# Patient Record
Sex: MALE | Race: WHITE
[De-identification: names, ages, dates, MRNs, and addresses within clinical notes are randomized per-mention and may not be internally consistent; named-entity substitution may affect disease eponyms.]

---

## 2019-04-26 ENCOUNTER — HOSPITAL ENCOUNTER (OUTPATIENT)
Dept: HOSPITAL 53 - M ED | Age: 59
LOS: 2 days | Discharge: HOME | End: 2019-04-28
Attending: SURGERY
Payer: COMMERCIAL

## 2019-04-26 VITALS — SYSTOLIC BLOOD PRESSURE: 111 MMHG | DIASTOLIC BLOOD PRESSURE: 57 MMHG

## 2019-04-26 VITALS — SYSTOLIC BLOOD PRESSURE: 134 MMHG | DIASTOLIC BLOOD PRESSURE: 62 MMHG

## 2019-04-26 VITALS — BODY MASS INDEX: 29.18 KG/M2 | WEIGHT: 215.46 LBS | HEIGHT: 72 IN

## 2019-04-26 VITALS — DIASTOLIC BLOOD PRESSURE: 54 MMHG | SYSTOLIC BLOOD PRESSURE: 94 MMHG

## 2019-04-26 DIAGNOSIS — Z79.899: ICD-10-CM

## 2019-04-26 DIAGNOSIS — E78.49: ICD-10-CM

## 2019-04-26 DIAGNOSIS — K35.32: Primary | ICD-10-CM

## 2019-04-26 DIAGNOSIS — I10: ICD-10-CM

## 2019-04-26 PROCEDURE — 88302 TISSUE EXAM BY PATHOLOGIST: CPT

## 2019-04-26 PROCEDURE — 44970 LAPAROSCOPY APPENDECTOMY: CPT

## 2019-04-26 PROCEDURE — 99284 EMERGENCY DEPT VISIT MOD MDM: CPT

## 2019-04-26 PROCEDURE — 85027 COMPLETE CBC AUTOMATED: CPT

## 2019-04-26 PROCEDURE — 96374 THER/PROPH/DIAG INJ IV PUSH: CPT

## 2019-04-26 PROCEDURE — 96361 HYDRATE IV INFUSION ADD-ON: CPT

## 2019-04-26 PROCEDURE — 96376 TX/PRO/DX INJ SAME DRUG ADON: CPT

## 2019-04-26 PROCEDURE — 36415 COLL VENOUS BLD VENIPUNCTURE: CPT

## 2019-04-26 PROCEDURE — 96372 THER/PROPH/DIAG INJ SC/IM: CPT

## 2019-04-26 PROCEDURE — 80048 BASIC METABOLIC PNL TOTAL CA: CPT

## 2019-04-26 PROCEDURE — 74019 RADEX ABDOMEN 2 VIEWS: CPT

## 2019-04-26 PROCEDURE — 96375 TX/PRO/DX INJ NEW DRUG ADDON: CPT

## 2019-04-26 RX ADMIN — POTASSIUM CHLORIDE, DEXTROSE MONOHYDRATE AND SODIUM CHLORIDE SCH MLS/HR: 150; 5; 450 INJECTION, SOLUTION INTRAVENOUS at 21:45

## 2019-04-26 RX ADMIN — DOCUSATE SODIUM,SENNOSIDES SCH TAB: 50; 8.6 TABLET, FILM COATED ORAL at 21:44

## 2019-04-26 NOTE — HPE
DATE OF ADMISSION:   04/26/2019

 

CHIEF COMPLAINT:  Abdominal pain, acute appendicitis.

 

HISTORY OF PRESENT ILLNESS:  This is a 59-year-old male who was transferred from

[facility] for acute appendicitis.

 

He states that he has been having nausea for about a month with dry heaves but no

emesis.  He started feeling sick and dizzy yesterday.  This morning around 3:30

a.m. he started having right lower quadrant abdominal pain, which has only gotten

worse.  He describes it as sharp and stabbing in nature.  It does not radiate

anywhere.  However, his abdomen is diffusely tender as well.  He denies any

diarrhea, fevers, chills, shortness of breath or palpitations.  He states his

only abdominal surgery was a left hernia repair in [place] over 5 years ago.  He

has not had any bright red blood per rectum, melena, or hematemesis.

 

REVIEW OF SYSTEMS:

Constitutional:  Denies any weight changes, fevers or chills.

HEENT:  Denies any headaches, visual changes, changes to hearing, sore throat,

runny nose or odynophagia.

Cardiovascular:  Denies any palpitations, chest pain, orthopnea, edema or

paroxysmal nocturnal dyspnea.

Respiratory:  Denies any shortness of breath, cough, sputum production, wheezes

or hemoptysis.

Gastrointestinal:  Positive for abdominal pain as described above as well as

nausea.  Denies emesis, hematemesis, hematochezia, melena, tenesmus or

obstipation.

Genitourinary:  Positive for difficulty urinating and a history of urethral

stricture.  Denies any polyuria, hematuria or incontinence.

Musculoskeletal:  Denies any joint swelling, muscle aches or pains.

Hematologic:  Denies any easy bruising or bleeding, petechiae, purpura.

Lymphatic:  Denies any new lumps or bumps anywhere.

Neurologic:  Denies any numbness or tingling, changes to smell, vision, taste or

hearing, history of seizures or motor deficits.

Psych:  Denies any anxiety, depression, anhedonia, paranoia or episodes of

kim.

 

PAST MEDICAL HISTORY:

Hypertension.

Hyperlipidemia.

History of urethral stricture.

Herpes simplex virus (HSV).

 

HOME MEDICATIONS:

- amlodipine 5 mg by mouth daily

- atorvastatin 10 mg by mouth daily

- lisinopril 40 mg by mouth daily

- Lysine 500 mg by mouth daily

- Saw Palmetto 160 mg by mouth daily

- spirolactone 100 mg by mouth daily

- Flomax 0.4 mg by mouth daily

- valacyclovir 500 mg by mouth daily

 

PAST SURGICAL HISTORY:

Left hernia repair in [place] over 5 years ago.

Surgery for urethral stricture.

Right knee surgery.

Parathyroidectomy.

Skin graft.

 

ALLERGIES:  None.

 

FAMILY HISTORY:  Noncontributory.

 

SOCIAL HISTORY:  The patient denies smoking cigarettes, tobacco use, alcohol, or

drug use.

 

PHYSICAL EXAMINATION:

Temperature 98.3 degrees, pulse is 70 and regular, respiratory rate 16, blood

pressure 160/60, pulse oximetry is 97% on room air.

General:  The patient is visibly uncomfortable lying on the stretcher.  His

position of comfort is with his hips flexed on his right side.

HEENT:  Mucous membranes are moist.  Head is atraumatic, normocephalic.

Extraocular eye movements are intact.

Neck:  Supple, no masses.

Respiratory:  Clear to auscultation bilaterally.  No wheezes, rhonchi or rales.

Heart:  Regular rate and rhythm.  No murmurs, gallops or rubs.

Abdomen:  Positive bowel sounds.  Belly is soft, and there is diffuse tenderness

to palpation.  He does have some pain upon flexion of the hips with guarding and

rebound tenderness.

Extremities:  No lower extremity edema bilaterally.  The patient has good

pulses.

 

CT of the abdomen and pelvis from [facility] was reviewed and shows

inflammation of the appendix with periappendiceal stranding suggestive of

appendicitis.

 

IMPRESSION/PLAN:

This is a 59-year-old male with an acute appendicitis.

 

Plan is to go to the operating room (OR) for a laparoscopic appendectomy.  He

will be admitted under the service of Dr. Nikolas Claros DO.

 

Preoperative antibiotic will be Zosyn 3.375 mg IV.

## 2019-04-27 VITALS — DIASTOLIC BLOOD PRESSURE: 59 MMHG | SYSTOLIC BLOOD PRESSURE: 125 MMHG

## 2019-04-27 VITALS — SYSTOLIC BLOOD PRESSURE: 131 MMHG | DIASTOLIC BLOOD PRESSURE: 61 MMHG

## 2019-04-27 VITALS — SYSTOLIC BLOOD PRESSURE: 131 MMHG | DIASTOLIC BLOOD PRESSURE: 68 MMHG

## 2019-04-27 VITALS — DIASTOLIC BLOOD PRESSURE: 65 MMHG | SYSTOLIC BLOOD PRESSURE: 138 MMHG

## 2019-04-27 VITALS — SYSTOLIC BLOOD PRESSURE: 136 MMHG | DIASTOLIC BLOOD PRESSURE: 62 MMHG

## 2019-04-27 VITALS — SYSTOLIC BLOOD PRESSURE: 125 MMHG | DIASTOLIC BLOOD PRESSURE: 58 MMHG

## 2019-04-27 VITALS — SYSTOLIC BLOOD PRESSURE: 132 MMHG | DIASTOLIC BLOOD PRESSURE: 61 MMHG

## 2019-04-27 LAB
BUN SERPL-MCNC: 23 MG/DL (ref 7–18)
CALCIUM SERPL-MCNC: 10.8 MG/DL (ref 8.5–10.1)
CHLORIDE SERPL-SCNC: 106 MEQ/L (ref 98–107)
CO2 SERPL-SCNC: 24 MEQ/L (ref 21–32)
CREAT SERPL-MCNC: 1.48 MG/DL (ref 0.7–1.3)
GFR SERPL CREATININE-BSD FRML MDRD: 51.8 ML/MIN/{1.73_M2} (ref 56–?)
GLUCOSE SERPL-MCNC: 154 MG/DL (ref 70–100)
HCT VFR BLD AUTO: 33.3 % (ref 42–52)
HGB BLD-MCNC: 11.4 G/DL (ref 13.5–17.5)
MCH RBC QN AUTO: 33.9 PG (ref 27–33)
MCHC RBC AUTO-ENTMCNC: 34.2 G/DL (ref 32–36.5)
MCV RBC AUTO: 99.1 FL (ref 80–96)
PLATELET # BLD AUTO: 209 10^3/UL (ref 150–450)
POTASSIUM SERPL-SCNC: 4.6 MEQ/L (ref 3.5–5.1)
RBC # BLD AUTO: 3.36 10^6/UL (ref 4.3–6.1)
SODIUM SERPL-SCNC: 134 MEQ/L (ref 136–145)
WBC # BLD AUTO: 18.8 10^3/UL (ref 4–10)

## 2019-04-27 RX ADMIN — DEXTROSE MONOHYDRATE SCH MLS/HR: 5 INJECTION INTRAVENOUS at 12:21

## 2019-04-27 RX ADMIN — SPIRONOLACTONE SCH MG: 50 TABLET ORAL at 08:23

## 2019-04-27 RX ADMIN — ACETAMINOPHEN PRN MG: 325 TABLET ORAL at 12:25

## 2019-04-27 RX ADMIN — DEXTROSE MONOHYDRATE SCH MLS/HR: 5 INJECTION INTRAVENOUS at 06:42

## 2019-04-27 RX ADMIN — PANTOPRAZOLE SODIUM SCH MG: 40 TABLET, DELAYED RELEASE ORAL at 08:24

## 2019-04-27 RX ADMIN — DOCUSATE SODIUM,SENNOSIDES SCH TAB: 50; 8.6 TABLET, FILM COATED ORAL at 20:35

## 2019-04-27 RX ADMIN — DEXTROSE MONOHYDRATE SCH MLS/HR: 5 INJECTION INTRAVENOUS at 23:50

## 2019-04-27 RX ADMIN — ACETAMINOPHEN PRN MG: 325 TABLET ORAL at 17:19

## 2019-04-27 RX ADMIN — POTASSIUM CHLORIDE, DEXTROSE MONOHYDRATE AND SODIUM CHLORIDE SCH MLS/HR: 150; 5; 450 INJECTION, SOLUTION INTRAVENOUS at 07:20

## 2019-04-27 RX ADMIN — ATORVASTATIN CALCIUM SCH MG: 10 TABLET, FILM COATED ORAL at 08:24

## 2019-04-27 RX ADMIN — LISINOPRIL SCH MG: 40 TABLET ORAL at 09:07

## 2019-04-27 RX ADMIN — DEXTROSE MONOHYDRATE SCH MLS/HR: 5 INJECTION INTRAVENOUS at 17:18

## 2019-04-27 RX ADMIN — ENOXAPARIN SODIUM SCH MG: 40 INJECTION SUBCUTANEOUS at 08:25

## 2019-04-27 RX ADMIN — ACETAMINOPHEN PRN MG: 325 TABLET ORAL at 03:58

## 2019-04-27 RX ADMIN — TAMSULOSIN HYDROCHLORIDE SCH MG: 0.4 CAPSULE ORAL at 08:24

## 2019-04-27 RX ADMIN — DEXTROSE MONOHYDRATE SCH MLS/HR: 5 INJECTION INTRAVENOUS at 01:08

## 2019-04-27 RX ADMIN — DOCUSATE SODIUM,SENNOSIDES SCH TAB: 50; 8.6 TABLET, FILM COATED ORAL at 08:24

## 2019-04-27 RX ADMIN — ACETAMINOPHEN PRN MG: 325 TABLET ORAL at 22:29

## 2019-04-27 NOTE — IPNPDOC
Text Note


Date of Service


The patient was seen on 4/27/19.





NOTE


No acute events overnight. He is tolerating diet. Denies nausea, emesis, fevers,

pains, or BMs. 





VSSAF





NAD





abd - soft, nt, nd, incisions c/d/i, drain in place with serous output





labs - below





A) 58y/o male s/p lap perforated appy





P) reg diet


ambulate


ok to shower


PO pain control 


abx


plan on dc in am if labs improve





Curtis Claros DO





A-FIB/CHADSVASC


A-FIB History


Current/History of A-Fib/PAF?:  No





VS,Fishbone, I+O


VS, Fishbone, I+O


Laboratory Tests


4/27/19 05:28








Red Blood Count 3.36 L, Mean Corpuscular Volume 99.1 H, Mean Corpuscular 

Hemoglobin 33.9 H, Mean Corpuscular Hemoglobin Concent 34.2, Red Cell 

Distribution Width 12.0, Calcium Level 10.8 H








Vital Signs








  Date Time  Temp Pulse Resp B/P (MAP) Pulse Ox O2 Delivery O2 Flow Rate FiO2


 


4/27/19 14:00 98.2 76 18 131/61 (84) 97   


 


4/26/19 17:04      Room Air  














I&O- Last 24 Hours up to 6 AM 


 


 4/27/19





 06:00


 


Intake Total 2880 ml


 


Output Total 675 ml


 


Balance 2205 ml

















HANNAH CLAROS DO            Apr 27, 2019 21:58

## 2019-04-28 VITALS — DIASTOLIC BLOOD PRESSURE: 54 MMHG | SYSTOLIC BLOOD PRESSURE: 108 MMHG

## 2019-04-28 VITALS — DIASTOLIC BLOOD PRESSURE: 63 MMHG | SYSTOLIC BLOOD PRESSURE: 130 MMHG

## 2019-04-28 VITALS — DIASTOLIC BLOOD PRESSURE: 60 MMHG | SYSTOLIC BLOOD PRESSURE: 132 MMHG

## 2019-04-28 LAB
BUN SERPL-MCNC: 18 MG/DL (ref 7–18)
CALCIUM SERPL-MCNC: 11.1 MG/DL (ref 8.5–10.1)
CHLORIDE SERPL-SCNC: 111 MEQ/L (ref 98–107)
CO2 SERPL-SCNC: 25 MEQ/L (ref 21–32)
CREAT SERPL-MCNC: 1.35 MG/DL (ref 0.7–1.3)
GFR SERPL CREATININE-BSD FRML MDRD: 57.6 ML/MIN/{1.73_M2} (ref 56–?)
GLUCOSE SERPL-MCNC: 96 MG/DL (ref 70–100)
HCT VFR BLD AUTO: 35.1 % (ref 42–52)
HGB BLD-MCNC: 11.7 G/DL (ref 13.5–17.5)
MCH RBC QN AUTO: 33.2 PG (ref 27–33)
MCHC RBC AUTO-ENTMCNC: 33.3 G/DL (ref 32–36.5)
MCV RBC AUTO: 99.7 FL (ref 80–96)
PLATELET # BLD AUTO: 234 10^3/UL (ref 150–450)
POTASSIUM SERPL-SCNC: 4.3 MEQ/L (ref 3.5–5.1)
RBC # BLD AUTO: 3.52 10^6/UL (ref 4.3–6.1)
SODIUM SERPL-SCNC: 139 MEQ/L (ref 136–145)
WBC # BLD AUTO: 16.5 10^3/UL (ref 4–10)

## 2019-04-28 RX ADMIN — ENOXAPARIN SODIUM SCH MG: 40 INJECTION SUBCUTANEOUS at 08:19

## 2019-04-28 RX ADMIN — PANTOPRAZOLE SODIUM SCH MG: 40 TABLET, DELAYED RELEASE ORAL at 08:21

## 2019-04-28 RX ADMIN — TAMSULOSIN HYDROCHLORIDE SCH MG: 0.4 CAPSULE ORAL at 08:19

## 2019-04-28 RX ADMIN — LISINOPRIL SCH MG: 40 TABLET ORAL at 08:20

## 2019-04-28 RX ADMIN — DEXTROSE MONOHYDRATE SCH MLS/HR: 5 INJECTION INTRAVENOUS at 12:12

## 2019-04-28 RX ADMIN — DEXTROSE MONOHYDRATE SCH MLS/HR: 5 INJECTION INTRAVENOUS at 06:49

## 2019-04-28 RX ADMIN — SPIRONOLACTONE SCH MG: 50 TABLET ORAL at 08:20

## 2019-04-28 RX ADMIN — ATORVASTATIN CALCIUM SCH MG: 10 TABLET, FILM COATED ORAL at 08:21

## 2019-04-28 RX ADMIN — DOCUSATE SODIUM,SENNOSIDES SCH TAB: 50; 8.6 TABLET, FILM COATED ORAL at 08:20

## 2019-04-28 NOTE — REP
ABDOMEN, TWO VIEWS:

 

HISTORY:  Abdominal pain.

 

A small amount of air is present in small and large intestine.  There is mild

distention of the colon.  Several air fluid levels are present in the colon .

There is no pneumoperitoneum.  A mild amount of stool is present in the colon.

 

IMPRESSION:

 

Nonspecific bowel gas pattern.

 

 

Electronically Signed by

Saroj Ayala MD 04/28/2019 10:55 A

## 2019-04-28 NOTE — IPNPDOC
Text Note


Date of Service


The patient was seen on 4/28/19.





NOTE


No acute events overnight. He is tolerating diet. Denies nausea, emesis, fevers,

or BM. He feels bloated and constipated this am. No other complaints. 





VSSAF





NAD





abd - soft, nt, alightly distended, incisions c/d/i, drain in place with serous 

output





labs - below  wbc - 18.8>16.5





A) 60y/o male s/p lap perforated appy





P) reg diet


ambulate


ok to shower


PO pain control 


abx


plan on d/c after a BM if he feels better





Curtis Claros DO





A-FIB/CHADSVASC


A-FIB History


Current/History of A-Fib/PAF?:  No





VS,Fishbone, I+O


VS, Fishbone, I+O


Laboratory Tests


4/28/19 05:32








Red Blood Count 3.52 L, Mean Corpuscular Volume 99.7 H, Mean Corpuscular 

Hemoglobin 33.2 H, Mean Corpuscular Hemoglobin Concent 33.3, Red Cell 

Distribution Width 12.1, Calcium Level 11.1 H








Vital Signs








  Date Time  Temp Pulse Resp B/P (MAP) Pulse Ox O2 Delivery O2 Flow Rate FiO2


 


4/28/19 08:21  68  132/60    


 


4/28/19 06:00 98.2  18  98   


 


4/26/19 17:04      Room Air  














I&O- Last 24 Hours up to 6 AM 


 


 4/28/19





 06:00


 


Intake Total 1910 ml


 


Output Total 2955 ml


 


Balance -1045 ml

















HANNAH CLAROS DO            Apr 28, 2019 08:45

## 2019-04-30 NOTE — RO
DATE OF PROCEDURE:  04/26/2019

 

PREOPERATIVE DIAGNOSIS:  Acute appendicitis.

 

POSTOPERATIVE DIAGNOSIS:  Acute perforated appendicitis.

 

PROCEDURE:  Laparoscopic appendectomy and drain placement.

 

SURGEON:  Dr. Claros

 

ASSISTANT:  None.

 

ANESTHESIA:  General.

 

ESTIMATED BLOOD LOSS:  5.

 

COMPLICATIONS:  None.

 

INDICATIONS FOR PROCEDURE:  The patient is a 59-year-old male who presents with

lower quadrant abdominal pain for about a day, found to have acute appendicitis

on CT.  Recommendation was to proceed with laparoscopic, possible open

appendectomy.  Risks and benefits of the procedure not limited but including

bleeding, infection, hernia formation, damage to surrounding structures, need for

further surgery were discussed in detail with the patient.  Informed consent was

obtained and procedure was planned.

 

DESCRIPTION OF PROCEDURE:  The patient brought back to operating room #8.  After

sufficient sedation, the abdomen was sterilely prepped and draped.

 

Time-out was done to confirm proper patient and proper procedure.  Following that

a 5 mm incision was made in the left lower quadrant, Veress needle was inserted

and the abdomen was insufflated 50 mmHg.

 

Veress needle was removed.  A 5 mm Optiview port was used to gain access to the

abdomen.  Once the abdomen was entered, a 5 mm port was placed suprapubically in

the midline, another 8 mm port supraumbilically in midline.  The abdomen was then

examined.  Multiple adhesions along the right abdominal wall.  Could not identify

the cecum at first. Starting taking out adhesions in the right upper abdominal

wall working my way inferiorly.  However, it appeared to be making slow progress,

so I grabbed the small bowel and traced it back until I found the terminal ileum,

which was plastered down in the right lower quadrant.  I was then able to elevate

that superiorly and found the appendix inferior to that way down in the pelvis.

I was then able to elevate that up in the air.  The appendix split in half,

drained a little bit of stool and a small amount of pus.  The rest the appendix

was then dissected free using the Enseal until the base was reached.

 

Once the base was reached, two PDS Endoloops were placed around it and the

appendix was amputated using Enseal.  Both pieces of the appendix were brought

out through a 5 mm Endo Catch bag.  I aspirated all the contents that leaked down

the right lower quadrant, placed a 19-Italian Dieudonne against the bed where the

appendix had been lying, brought it out through the suprapubic port site, sutured

in place with #2-0 silk suture.  Then, I brought the specimen out through the

supraumbilical port site, deflated the abdomen, closed the skin incision with

#4-0 Vicryl subcuticular sutures.  The abdomen was then cleaned and dried.

Steri-Strips, 4 x 4, and tape were applied, thus ending procedure.

## 2020-05-11 ENCOUNTER — HOSPITAL ENCOUNTER (OUTPATIENT)
Dept: HOSPITAL 53 - M LABDRAWC | Age: 60
End: 2020-05-11
Attending: INTERNAL MEDICINE
Payer: COMMERCIAL

## 2020-05-11 ENCOUNTER — HOSPITAL ENCOUNTER (OUTPATIENT)
Dept: HOSPITAL 53 - M LABDRAWC | Age: 60
End: 2020-05-11
Payer: COMMERCIAL

## 2020-05-11 DIAGNOSIS — E21.0: Primary | ICD-10-CM

## 2020-05-11 DIAGNOSIS — E78.5: Primary | ICD-10-CM

## 2020-05-11 DIAGNOSIS — R35.0: ICD-10-CM

## 2020-05-11 DIAGNOSIS — E06.3: ICD-10-CM

## 2020-05-11 DIAGNOSIS — E83.52: ICD-10-CM

## 2020-05-11 DIAGNOSIS — Z12.5: ICD-10-CM

## 2020-05-11 DIAGNOSIS — I12.9: ICD-10-CM

## 2020-05-11 LAB
ALBUMIN SERPL BCG-MCNC: 3.9 GM/DL (ref 3.2–5.2)
ALBUMIN SERPL BCG-MCNC: 4 GM/DL (ref 3.2–5.2)
ALT SERPL W P-5'-P-CCNC: 27 U/L (ref 12–78)
ALT SERPL W P-5'-P-CCNC: 28 U/L (ref 12–78)
BASOPHILS # BLD AUTO: 0.1 10^3/UL (ref 0–0.2)
BASOPHILS NFR BLD AUTO: 0.9 % (ref 0–1)
BILIRUB SERPL-MCNC: 0.4 MG/DL (ref 0.2–1)
BILIRUB SERPL-MCNC: 0.4 MG/DL (ref 0.2–1)
BUN SERPL-MCNC: 21 MG/DL (ref 7–18)
BUN SERPL-MCNC: 22 MG/DL (ref 7–18)
CA-I BLD-MCNC: 5.5 MG/DL (ref 4.5–5.3)
CALCIUM SERPL-MCNC: 10 MG/DL (ref 8.8–10.2)
CALCIUM SERPL-MCNC: 9.8 MG/DL (ref 8.8–10.2)
CHLORIDE SERPL-SCNC: 106 MEQ/L (ref 98–107)
CHLORIDE SERPL-SCNC: 108 MEQ/L (ref 98–107)
CHOLEST SERPL-MCNC: 183 MG/DL (ref ?–200)
CHOLEST/HDLC SERPL: 3.45 {RATIO} (ref ?–5)
CO2 SERPL-SCNC: 26 MEQ/L (ref 21–32)
CO2 SERPL-SCNC: 27 MEQ/L (ref 21–32)
CREAT SERPL-MCNC: 1.03 MG/DL (ref 0.7–1.3)
CREAT SERPL-MCNC: 1.04 MG/DL (ref 0.7–1.3)
EOSINOPHIL # BLD AUTO: 0.2 10^3/UL (ref 0–0.5)
EOSINOPHIL NFR BLD AUTO: 3.2 % (ref 0–3)
GFR SERPL CREATININE-BSD FRML MDRD: > 60 ML/MIN/{1.73_M2} (ref 49–?)
GFR SERPL CREATININE-BSD FRML MDRD: > 60 ML/MIN/{1.73_M2} (ref 49–?)
GLUCOSE SERPL-MCNC: 101 MG/DL (ref 70–100)
GLUCOSE SERPL-MCNC: 97 MG/DL (ref 70–100)
HCT VFR BLD AUTO: 44.1 % (ref 42–52)
HDLC SERPL-MCNC: 53 MG/DL (ref 40–?)
HGB BLD-MCNC: 14.8 G/DL (ref 13.5–17.5)
LDLC SERPL CALC-MCNC: 105 MG/DL (ref ?–100)
LYMPHOCYTES # BLD AUTO: 1.5 10^3/UL (ref 1.5–5)
LYMPHOCYTES NFR BLD AUTO: 23.5 % (ref 24–44)
MCH RBC QN AUTO: 32.1 PG (ref 27–33)
MCHC RBC AUTO-ENTMCNC: 33.6 G/DL (ref 32–36.5)
MCV RBC AUTO: 95.7 FL (ref 80–96)
MONOCYTES # BLD AUTO: 0.6 10^3/UL (ref 0–0.8)
MONOCYTES NFR BLD AUTO: 9.7 % (ref 0–5)
NEUTROPHILS # BLD AUTO: 4.1 10^3/UL (ref 1.5–8.5)
NEUTROPHILS NFR BLD AUTO: 62.4 % (ref 36–66)
NONHDLC SERPL-MCNC: 130 MG/DL
PLATELET # BLD AUTO: 213 10^3/UL (ref 150–450)
POTASSIUM SERPL-SCNC: 4.6 MEQ/L (ref 3.5–5.1)
POTASSIUM SERPL-SCNC: 4.6 MEQ/L (ref 3.5–5.1)
PROT SERPL-MCNC: 7 GM/DL (ref 6.4–8.2)
PROT SERPL-MCNC: 7.1 GM/DL (ref 6.4–8.2)
PTH-INTACT SERPL-MCNC: 225 PG/ML (ref 18.5–88)
RBC # BLD AUTO: 4.61 10^6/UL (ref 4.3–6.1)
SODIUM SERPL-SCNC: 138 MEQ/L (ref 136–145)
SODIUM SERPL-SCNC: 139 MEQ/L (ref 136–145)
T4 FREE SERPL-MCNC: 0.79 NG/DL (ref 0.76–1.46)
TRIGL SERPL-MCNC: 126 MG/DL (ref ?–150)
TSH SERPL DL<=0.005 MIU/L-ACNC: 4.68 UIU/ML (ref 0.36–3.74)
WBC # BLD AUTO: 6.5 10^3/UL (ref 4–10)

## 2020-05-11 PROCEDURE — 85025 COMPLETE CBC W/AUTO DIFF WBC: CPT

## 2020-05-11 PROCEDURE — 80061 LIPID PANEL: CPT

## 2020-05-11 PROCEDURE — 36415 COLL VENOUS BLD VENIPUNCTURE: CPT

## 2020-05-11 PROCEDURE — 80053 COMPREHEN METABOLIC PANEL: CPT

## 2020-08-27 ENCOUNTER — HOSPITAL ENCOUNTER (OUTPATIENT)
Dept: HOSPITAL 53 - M LABDRAWC | Age: 60
End: 2020-08-27
Attending: INTERNAL MEDICINE
Payer: COMMERCIAL

## 2020-08-27 DIAGNOSIS — I12.9: Primary | ICD-10-CM

## 2020-08-27 DIAGNOSIS — N18.3: ICD-10-CM

## 2020-08-27 DIAGNOSIS — E83.52: ICD-10-CM

## 2020-08-28 LAB
BUN SERPL-MCNC: 21 MG/DL (ref 7–18)
CALCIUM SERPL-MCNC: 11.2 MG/DL (ref 8.8–10.2)
CHLORIDE SERPL-SCNC: 106 MEQ/L (ref 98–107)
CO2 SERPL-SCNC: 25 MEQ/L (ref 21–32)
CREAT SERPL-MCNC: 1.18 MG/DL (ref 0.7–1.3)
GFR SERPL CREATININE-BSD FRML MDRD: > 60 ML/MIN/{1.73_M2} (ref 49–?)
GLUCOSE SERPL-MCNC: 93 MG/DL (ref 70–100)
HCT VFR BLD AUTO: 41.4 % (ref 42–52)
HGB BLD-MCNC: 14.1 G/DL (ref 13.5–17.5)
MCH RBC QN AUTO: 32.9 PG (ref 27–33)
MCHC RBC AUTO-ENTMCNC: 34.1 G/DL (ref 32–36.5)
MCV RBC AUTO: 96.7 FL (ref 80–96)
PLATELET # BLD AUTO: 214 10^3/UL (ref 150–450)
POTASSIUM SERPL-SCNC: 4.3 MEQ/L (ref 3.5–5.1)
PTH-INTACT SERPL-MCNC: 221.9 PG/ML (ref 18.5–88)
RBC # BLD AUTO: 4.28 10^6/UL (ref 4.3–6.1)
SODIUM SERPL-SCNC: 137 MEQ/L (ref 136–145)
URATE SERPL-MCNC: 7.2 MG/DL (ref 3.5–7.2)
WBC # BLD AUTO: 7.4 10^3/UL (ref 4–10)

## 2020-09-25 ENCOUNTER — HOSPITAL ENCOUNTER (OUTPATIENT)
Dept: HOSPITAL 53 - M LABDRAWC | Age: 60
End: 2020-09-25
Attending: INTERNAL MEDICINE
Payer: COMMERCIAL

## 2020-09-25 DIAGNOSIS — E06.3: Primary | ICD-10-CM

## 2020-09-25 LAB
ALBUMIN SERPL BCG-MCNC: 3.8 GM/DL (ref 3.2–5.2)
ALT SERPL W P-5'-P-CCNC: 23 U/L (ref 12–78)
BILIRUB SERPL-MCNC: 0.6 MG/DL (ref 0.2–1)
BUN SERPL-MCNC: 21 MG/DL (ref 7–18)
CA-I BLD-MCNC: 5.3 MG/DL (ref 4.5–5.3)
CALCIUM SERPL-MCNC: 10 MG/DL (ref 8.8–10.2)
CHLORIDE SERPL-SCNC: 109 MEQ/L (ref 98–107)
CO2 SERPL-SCNC: 28 MEQ/L (ref 21–32)
CREAT SERPL-MCNC: 1.17 MG/DL (ref 0.7–1.3)
GFR SERPL CREATININE-BSD FRML MDRD: > 60 ML/MIN/{1.73_M2} (ref 49–?)
GLUCOSE SERPL-MCNC: 111 MG/DL (ref 70–100)
POTASSIUM SERPL-SCNC: 4.4 MEQ/L (ref 3.5–5.1)
PROT SERPL-MCNC: 7.1 GM/DL (ref 6.4–8.2)
SODIUM SERPL-SCNC: 139 MEQ/L (ref 136–145)
T4 FREE SERPL-MCNC: 0.82 NG/DL (ref 0.76–1.46)
TSH SERPL DL<=0.005 MIU/L-ACNC: 3.38 UIU/ML (ref 0.36–3.74)